# Patient Record
Sex: MALE
[De-identification: names, ages, dates, MRNs, and addresses within clinical notes are randomized per-mention and may not be internally consistent; named-entity substitution may affect disease eponyms.]

---

## 2021-12-28 ENCOUNTER — NURSE TRIAGE (OUTPATIENT)
Dept: OTHER | Facility: CLINIC | Age: 51
End: 2021-12-28

## 2021-12-28 NOTE — TELEPHONE ENCOUNTER
Subjective:   \"My  just fell in the driveway, about 10 minutes ago, and hit the back of his head. He was walking down driveway, fell on back side, then head. \"    Takes aspirin and BP medication. Head hurts, lump is less than two inches. Small cut - not bleeding. Pain - 2-3/10. Reason for Disposition   [1] Last tetanus shot > 5 years ago AND [2] DIRTY cut or scrape    Protocols used: HEAD INJURY-ADULT-AH    Couldn't feel arms for about 10 seconds, but resolved and has not had issues since. What has been tried: nothing. Suggested Ice pack and Tylenol. Don't take aspirin or Ibuprofen. Recommended disposition: Follow up with PCP in the next 1-3 days for tetanus shot, follow up. of symptoms. Asked if ok to work at computer. Advise to take it easy for a few days and take breaks at the computer. If any changes in current status, blurry vision, vomiting, severe head pain etc.. please call back or see medication attention. Care advice provided, patient verbalizes understanding; denies any other questions or concerns; instructed to call back for any new or worsening symptoms. This triage is a result of a call to [address] [place] [address]. Please do not respond to the triage nurse through this encounter. Any subsequent communication should be directly with the patient.